# Patient Record
Sex: MALE | Race: WHITE | NOT HISPANIC OR LATINO | URBAN - METROPOLITAN AREA
[De-identification: names, ages, dates, MRNs, and addresses within clinical notes are randomized per-mention and may not be internally consistent; named-entity substitution may affect disease eponyms.]

---

## 2023-02-13 ENCOUNTER — EMERGENCY (EMERGENCY)
Facility: HOSPITAL | Age: 38
LOS: 1 days | Discharge: HOME | End: 2023-02-13
Attending: STUDENT IN AN ORGANIZED HEALTH CARE EDUCATION/TRAINING PROGRAM | Admitting: STUDENT IN AN ORGANIZED HEALTH CARE EDUCATION/TRAINING PROGRAM
Payer: COMMERCIAL

## 2023-02-13 VITALS
DIASTOLIC BLOOD PRESSURE: 85 MMHG | RESPIRATION RATE: 18 BRPM | OXYGEN SATURATION: 98 % | TEMPERATURE: 98 F | SYSTOLIC BLOOD PRESSURE: 135 MMHG | HEART RATE: 65 BPM

## 2023-02-13 VITALS
OXYGEN SATURATION: 96 % | SYSTOLIC BLOOD PRESSURE: 140 MMHG | DIASTOLIC BLOOD PRESSURE: 85 MMHG | WEIGHT: 190.04 LBS | TEMPERATURE: 98 F | HEART RATE: 63 BPM | RESPIRATION RATE: 18 BRPM

## 2023-02-13 LAB
ALBUMIN SERPL ELPH-MCNC: 4.6 G/DL — SIGNIFICANT CHANGE UP (ref 3.3–5)
ALP SERPL-CCNC: 82 U/L — SIGNIFICANT CHANGE UP (ref 40–120)
ALT FLD-CCNC: 23 U/L — SIGNIFICANT CHANGE UP (ref 10–45)
ANION GAP SERPL CALC-SCNC: 12 MMOL/L — SIGNIFICANT CHANGE UP (ref 5–17)
AST SERPL-CCNC: 27 U/L — SIGNIFICANT CHANGE UP (ref 10–40)
BASOPHILS # BLD AUTO: 0.07 K/UL — SIGNIFICANT CHANGE UP (ref 0–0.2)
BASOPHILS NFR BLD AUTO: 1.1 % — SIGNIFICANT CHANGE UP (ref 0–2)
BILIRUB SERPL-MCNC: 1.4 MG/DL — HIGH (ref 0.2–1.2)
BUN SERPL-MCNC: 13 MG/DL — SIGNIFICANT CHANGE UP (ref 7–23)
CALCIUM SERPL-MCNC: 9.5 MG/DL — SIGNIFICANT CHANGE UP (ref 8.4–10.5)
CHLORIDE SERPL-SCNC: 100 MMOL/L — SIGNIFICANT CHANGE UP (ref 96–108)
CO2 SERPL-SCNC: 25 MMOL/L — SIGNIFICANT CHANGE UP (ref 22–31)
CREAT SERPL-MCNC: 1.21 MG/DL — SIGNIFICANT CHANGE UP (ref 0.5–1.3)
EGFR: 78 ML/MIN/1.73M2 — SIGNIFICANT CHANGE UP
EOSINOPHIL # BLD AUTO: 0.37 K/UL — SIGNIFICANT CHANGE UP (ref 0–0.5)
EOSINOPHIL NFR BLD AUTO: 5.6 % — SIGNIFICANT CHANGE UP (ref 0–6)
GLUCOSE SERPL-MCNC: 114 MG/DL — HIGH (ref 70–99)
HCT VFR BLD CALC: 43.3 % — SIGNIFICANT CHANGE UP (ref 39–50)
HGB BLD-MCNC: 14.9 G/DL — SIGNIFICANT CHANGE UP (ref 13–17)
IMM GRANULOCYTES NFR BLD AUTO: 0.3 % — SIGNIFICANT CHANGE UP (ref 0–0.9)
LYMPHOCYTES # BLD AUTO: 1.42 K/UL — SIGNIFICANT CHANGE UP (ref 1–3.3)
LYMPHOCYTES # BLD AUTO: 21.3 % — SIGNIFICANT CHANGE UP (ref 13–44)
MCHC RBC-ENTMCNC: 31.1 PG — SIGNIFICANT CHANGE UP (ref 27–34)
MCHC RBC-ENTMCNC: 34.4 GM/DL — SIGNIFICANT CHANGE UP (ref 32–36)
MCV RBC AUTO: 90.4 FL — SIGNIFICANT CHANGE UP (ref 80–100)
MONOCYTES # BLD AUTO: 0.45 K/UL — SIGNIFICANT CHANGE UP (ref 0–0.9)
MONOCYTES NFR BLD AUTO: 6.8 % — SIGNIFICANT CHANGE UP (ref 2–14)
NEUTROPHILS # BLD AUTO: 4.33 K/UL — SIGNIFICANT CHANGE UP (ref 1.8–7.4)
NEUTROPHILS NFR BLD AUTO: 64.9 % — SIGNIFICANT CHANGE UP (ref 43–77)
NRBC # BLD: 0 /100 WBCS — SIGNIFICANT CHANGE UP (ref 0–0)
PLATELET # BLD AUTO: 206 K/UL — SIGNIFICANT CHANGE UP (ref 150–400)
POTASSIUM SERPL-MCNC: 3.6 MMOL/L — SIGNIFICANT CHANGE UP (ref 3.5–5.3)
POTASSIUM SERPL-SCNC: 3.6 MMOL/L — SIGNIFICANT CHANGE UP (ref 3.5–5.3)
PROT SERPL-MCNC: 6.3 G/DL — SIGNIFICANT CHANGE UP (ref 6–8.3)
RBC # BLD: 4.79 M/UL — SIGNIFICANT CHANGE UP (ref 4.2–5.8)
RBC # FLD: 12 % — SIGNIFICANT CHANGE UP (ref 10.3–14.5)
SARS-COV-2 RNA SPEC QL NAA+PROBE: NEGATIVE — SIGNIFICANT CHANGE UP
SODIUM SERPL-SCNC: 137 MMOL/L — SIGNIFICANT CHANGE UP (ref 135–145)
WBC # BLD: 6.66 K/UL — SIGNIFICANT CHANGE UP (ref 3.8–10.5)
WBC # FLD AUTO: 6.66 K/UL — SIGNIFICANT CHANGE UP (ref 3.8–10.5)

## 2023-02-13 PROCEDURE — 99284 EMERGENCY DEPT VISIT MOD MDM: CPT

## 2023-02-13 PROCEDURE — 80053 COMPREHEN METABOLIC PANEL: CPT

## 2023-02-13 PROCEDURE — 12001 RPR S/N/AX/GEN/TRNK 2.5CM/<: CPT

## 2023-02-13 PROCEDURE — 85025 COMPLETE CBC W/AUTO DIFF WBC: CPT

## 2023-02-13 PROCEDURE — 87635 SARS-COV-2 COVID-19 AMP PRB: CPT

## 2023-02-13 PROCEDURE — 99284 EMERGENCY DEPT VISIT MOD MDM: CPT | Mod: 25

## 2023-02-13 PROCEDURE — 99285 EMERGENCY DEPT VISIT HI MDM: CPT | Mod: 25

## 2023-02-13 PROCEDURE — 36415 COLL VENOUS BLD VENIPUNCTURE: CPT

## 2023-02-13 PROCEDURE — 96374 THER/PROPH/DIAG INJ IV PUSH: CPT | Mod: XU

## 2023-02-13 PROCEDURE — 11750 EXCISION NAIL&NAIL MATRIX: CPT | Mod: T5,XU

## 2023-02-13 RX ORDER — CEFAZOLIN SODIUM 1 G
1000 VIAL (EA) INJECTION ONCE
Refills: 0 | Status: COMPLETED | OUTPATIENT
Start: 2023-02-13 | End: 2023-02-13

## 2023-02-13 RX ORDER — CEPHALEXIN 500 MG
1 CAPSULE ORAL
Qty: 21 | Refills: 0
Start: 2023-02-13 | End: 2023-02-19

## 2023-02-13 RX ADMIN — Medication 100 MILLIGRAM(S): at 23:03

## 2023-02-13 NOTE — ED PROVIDER NOTE - NSFOLLOWUPINSTRUCTIONS_ED_ALL_ED_FT
keep dressing dry intact and clean . If conditions worsen return to ED: increased redness, swelling, pain, fever, chills, pus drainage.     Abx sent to your pharmacy, take as directed.     Patient should follow up with Dr. Rinku Zimmerman within 1 week of discharge.    Office information:          [place] Address- [address], [place] [address] Phone: [phone]         [place] Address- [address], [place] [address] Phone: [phone]         [place] Address- [address] ([address]trance on [address]) [place] [address] Phone [phone]        Toe Fracture    A foot showing fractures in the bones of the first two toes.   A toe fracture is a break in one of the toe bones (phalanges). A toe fracture may be:  •A crack in the surface of the bone (stress fracture). This often occurs in athletes.      •A break all the way through the bone (complete fracture).        What are the causes?    This condition may be caused by:  •Direct impact, such as from dropping a heavy object on your toe.      •Stubbing your toe.      •Twisting or stretching your toe out of place.      •Overuse or repetitive exercise.        What increases the risk?    You are more likely to develop this condition if you:  •Play contact sports.      •Have a condition that causes the bones to become thin and brittle (osteoporosis).      •Have a low calcium level.        What are the signs or symptoms?  Bones and joints of the foot and toe showing a fracture and blood beneath the toenail.   The main symptoms of this condition are swelling and pain in the toe. Other symptoms include:  •Bruising.      •Stiffness.      •Numbness.      •A change in the way the toe looks.      •Broken bones that poke through the skin.      •Blood beneath the toenail.        How is this diagnosed?    This condition is diagnosed with a physical exam. You may also have X-rays.      How is this treated?    Treatment for this condition depends on the type of fracture and its severity. Treatment may include:  •Taping the broken toe to a toe that is next to it (shay taping). This is the most common treatment for fractures in which the bone has not moved out of place (non-displaced fracture).      •Wearing a shoe that has a wide, rigid sole to protect the toe and to limit its movement.      •Wearing a walking cast.      •Having a procedure to move the toe back into place.    •Surgery. This may be needed if the:  •Pieces of broken bone are out of place (displaced).      •Bone breaks through the skin.        •Physical therapy. This is done to help regain movement and strength in the toe.      You may need follow-up X-rays to make sure that the bone is healing well and staying in position.      Follow these instructions at home:    If you have a shoe:     •Wear the shoe as told by your health care provider. Remove it only as told by your health care provider.       • Loosen the shoe if your toes tingle, become numb, or turn cold and blue.      •Keep the shoe clean and dry.      If you have a cast:     • Do not put pressure on any part of the cast until it is fully hardened. This may take several hours.      • Do not stick anything inside the cast to scratch your skin. Doing that increases your risk of infection.      •Check the skin around the cast every day. Tell your health care provider about any concerns.       • You may put lotion on dry skin around the edges of the cast. Do not put lotion on the skin underneath the cast.       •Keep the cast clean and dry.      Bathing     • Do not take baths, swim, or use a hot tub until your health care provider approves. Ask your health care provider if you can take showers.    •If the shoe or cast is not waterproof:  •Do not let it get wet.       •Cover it with a watertight covering when you take a bath or a shower.        Activity     • Do not use the injured foot to support your body weight until your health care provider says that you can. Use crutches as directed.    •Ask your health care provider:  •What activities are safe for you during recovery.      •What activities you need to avoid.        •Do physical therapy exercises as directed.      Driving     • Do not drive or use heavy machinery while taking pain medicine.      • Do not drive while wearing a cast on a foot that you use for driving.        Managing pain, stiffness, and swelling   A bag of ice on a towel on the skin. •If directed, put ice on painful areas:  •Put ice in a plastic bag.    •Place a towel between your skin and the bag.  •If you have a shoe, remove it as told by your health care provider.      •If you have a cast, place a towel between your cast and the bag.        •Leave the ice on for 20 minutes, 2–3 times per day.        •Raise (elevate) the injured area above the level of your heart while you are sitting or lying down.      General instructions   •If your toe was treated with shay taping, follow your health care provider's instructions for changing the gauze and tape. Change it more often if:  •The gauze and tape get wet. If this happens, dry the space between the toes.      •The gauze and tape are too tight and cause your toe to become pale or numb.        •If you were not given a protective shoe, wear sturdy, supportive shoes. Your shoes should not pinch your toes and should not fit tightly against your toes.      • Do not use any products that contain nicotine or tobacco, such as cigarettes and e-cigarettes. These can delay bone healing. If you need help quitting, ask your health care provider.      •Take over-the-counter and prescription medicines only as told by your health care provider.      •Keep all follow-up visits as told by your health care provider. This is important.        Contact a health care provider if you have:    •Pain that gets worse or does not get better with medicine.      •A fever.      •A bad smell coming from your cast.        Get help right away if you have:  •Any of the following in your toes or your foot:  •Numbness that gets worse.      •Tingling.      •Coldness.      •Blue skin.        •Redness or swelling that gets worse.      •Pain that suddenly becomes severe.        Summary    •A toe fracture is a break in one of the toe bones (phalanges).      •Treatment depends on how severe your fracture is and how the pieces of the broken bone line up with each other. Treatment may include shay taping, wearing a shoe or a cast, or using crutches.      •Ice and elevate your foot to help lessen the pain and swelling.      This information is not intended to replace advice given to you by your health care provider. Make sure you discuss any questions you have with your health care provider.

## 2023-02-13 NOTE — ED PROVIDER NOTE - CLINICAL SUMMARY MEDICAL DECISION MAKING FREE TEXT BOX
37 y.o male complaining of R toe pain. outpt XR shows non-displaced fx of r great toe. Clinically >50% subungual hematoma of the R great toe.     Plan  -Podiatry evaluation and disposition  - d/c with podiatry recc, and iv abx

## 2023-02-13 NOTE — ED PROVIDER NOTE - PHYSICAL EXAMINATION
R foot:   R foot pulses +2  neruo intact   normal ROM of R 1st MPJ   >50% sungunal hematoma of the great toe, with dreied hematoma at the base of the nail  Tender to palpation R great toe

## 2023-02-13 NOTE — ED PROVIDER NOTE - PROGRESS NOTE DETAILS
Podiatry bedside for evaluation and nail avulsion. Pt with no complaints. To be discharged home with loca wound care reccs per podiatry, PO abx, and podiatry outpt follow up.

## 2023-02-13 NOTE — ED PROVIDER NOTE - PATIENT PORTAL LINK FT
You can access the FollowMyHealth Patient Portal offered by [facility] by registering at the following website: [email or web address]. By joining [employer]’s FollowMyHealth portal, you will also be able to view your health information using other applications (apps) compatible with our system.

## 2023-02-13 NOTE — ED PROVIDER NOTE - NS ED ATTENDING STATEMENT MOD
I have seen and examined this patient and fully participated in the care of this patient as the teaching attending.  The service was shared with the ADOLPH.  I reviewed and verified the documentation and independently performed the documented:

## 2023-02-13 NOTE — ED ADULT NURSE NOTE - OBJECTIVE STATEMENT
Pt is 37 y.o male client complaining of R toe pain. Pt states a kettle fell on his toe yesterday and had pain and bleeding. Went to [facility] today and had imaging sows fracture. Pt has bandage wrapped around toe. Denies fever and chills, numbness and tingling. Assessment ongoing. Will cont to monitor.

## 2023-02-13 NOTE — ED PROVIDER NOTE - ATTENDING CONTRIBUTION TO CARE
37 y.o male complaining of R toe pain after an object fell on his R great toe yesterday, XR from  shows non-displaced fx of R great toe. On exam, VS wnl, pt awake alert and nad skin warm well perfused no diaphoresis a/w patent pt speaking and breathing comfortably. pt evaluated by podiatry who recommend 1 dose of ancef, the R great toe was debrided, pt was discharged with keflex and podiatry f/u per podiatry recs

## 2023-02-13 NOTE — ED PROVIDER NOTE - OBJECTIVE STATEMENT
37 y.o male complaining of R toe pain. Pt states a kettle fell on his toe yesterday and had pain and bleeding. Went to [facility] today and had imaging showed non-displaced fracture of the great toe. Reports no pain on ambulation and adequate ROM. Tender to palpation.  Pt has bandage wrapped around toe. Denies fever and chills, numbness and tingling.

## 2023-02-14 DIAGNOSIS — S91.219A: ICD-10-CM

## 2023-02-14 NOTE — CONSULT NOTE ADULT - SUBJECTIVE AND OBJECTIVE BOX
Attending: Dr. Zimmerman    Patient is a 37y old  Male who presents with a chief complaint of right great toe pain     HPI:  38 y/o male no pmhx complaining of R toe pain. Pt states a kettle fell on his toe yesterday and had pain and bleeding. Went to [facility] today and had imaging showed non-displaced fracture of the great toe. Reports no pain on ambulation and adequate ROM. Tender to palpation. Pt has bandage wrapped around toe. Denies fever and chills, numbness and tingling.    Review of systems negative except per HPI and as stated below  General:	 no weakness; no fevers, no chills  Skin/Breast: no rash  Respiratory and Thorax: no SOB, no cough  Cardiovascular:	No chest pain  Gastrointestinal:	 no nausea, vomiting , diarrhea  Genitourinary:	no dysuria, no difficulty urinating, no hematuria  Musculoskeletal:	no weakness, no joint swelling/pain  Neurological:	no focal weakness/numbness  Endocrine:	no polyuria, no polydipsia    PAST MEDICAL & SURGICAL HISTORY:  No pertinent past medical history      No significant past surgical history        Home Medications: none    Allergies    No Known Allergies    Intolerances      FAMILY HISTORY: No pertinent medical history in first degree relatives    Social History: admits to ETOH       LABS                        14.9   6.66  )-----------( 206      ( 13 Feb 2023 23:26 )             43.3     02-13    137  |  100  |  13  ----------------------------<  114<H>  3.6   |  25  |  1.21    Ca    9.5      13 Feb 2023 23:26    TPro  6.3  /  Alb  4.6  /  TBili  1.4<H>  /  DBili  x   /  AST  27  /  ALT  23  /  AlkPhos  82  02-13        Vital Signs Last 24 Hrs  T(C): 36.9 (13 Feb 2023 23:24), Max: 36.9 (13 Feb 2023 21:23)  T(F): 98.5 (13 Feb 2023 23:24), Max: 98.5 (13 Feb 2023 23:24)  HR: 65 (13 Feb 2023 23:24) (63 - 65)  BP: 135/85 (13 Feb 2023 23:24) (135/85 - 140/85)  BP(mean): --  RR: 18 (13 Feb 2023 23:24) (18 - 18)  SpO2: 98% (13 Feb 2023 23:24) (96% - 98%)    Parameters below as of 13 Feb 2023 23:24  Patient On (Oxygen Delivery Method): room air      PHYSICAL EXAM  General: NAD, AA0x3  Lower Extremity Focused: right foot  Vasc: DP/PT 2/4 , slight edema to right great toe, ecchymosis to nail matrix    Derm: >50% subungual hematoma of the great toe, with dried hematoma at the base of the nail, laceration to nail matrix, nail slightly lifted   Neuro: sensation intact to all digits   MSK: right great toe ttp, 5/5 muscle strength in all compartments      RADIOLOGY: x ray right foot: WET READ: non-displaced comminuted fracture of the distal phalanx of the right great toe

## 2023-02-14 NOTE — PROCEDURE NOTE - ADDITIONAL PROCEDURE DETAILS
OPERATIVE PROCEDURE:   local infiltration nerve block was administered to the right foot great toe utilizing 7cc 1% lidocaine plain in H block fashion. Chlorohexadine prep was used to sterilize the right foot prior to local anesthetic block.    The right foot was prepped and draped in aseptic manner. Anesthesia check was performed and verified. Right great toe nail was avulsed in toto. Small simple laceration was noted to nail bed with overlying hematoma, nail bed was then copiously irrigated using normal saline. Hematoma was evacuated in toto.     Suture repair of the simple laceration was instituted using 2 5-0 Monocryl suture placed in simple interrupted fashion. Skin closure was applied by these means.     Dressing were applied consisting bacitracin + Xeroform +  Coban + DSD applied to right foot with surgical shoe.

## 2023-02-14 NOTE — PROCEDURE NOTE - NSLAC1DETAILSPROC_SKIN_A_CORE
wound explored to base in bloodless field/all foreign material removed/nail excised/extensive debridement

## 2023-02-14 NOTE — CONSULT NOTE ADULT - ASSESSMENT
A: 38 y/o male no pmhx complaining of R toe pain. Pt states a kettle fell on his toe yesterday and had pain and bleeding. Went to [facility] today and had imaging showed non-displaced fracture of the great toe. Podiatry consulted for right great toe fracture. Pt found to have right great toe open fracture with subungual hematoma.     Plan:   X-ray reviewed  Rec 1g  IV ancef   Total nail avulsion performed of right great toe with nail laceration repair-see procedure note for details   Wound dressed with bacitracin, xeroform, guaze, coban, kerlix, ace  Pt instructed to rest right foot as much as possible and only ambulate in a surgical shoe   Rec 7 days of Keflex TID  Pt can take OTC pain medication for pain control   Pt instructed to keep bandage clean dry and intact until follow up visit with Dr. Zimmerman     Plan d/w attending     Patient should follow up with Dr. Rinku Zimmerman within 1 week of discharge.    Office information:          [place] Address- [address], [place] [address] Phone: [phone]         [place] Address- [address], [place] [address] Phone: [phone]

## 2023-02-16 DIAGNOSIS — M79.674 PAIN IN RIGHT TOE(S): ICD-10-CM

## 2023-02-16 DIAGNOSIS — Z20.822 CONTACT WITH AND (SUSPECTED) EXPOSURE TO COVID-19: ICD-10-CM

## 2023-02-16 DIAGNOSIS — S92.424A NONDISPLACED FRACTURE OF DISTAL PHALANX OF RIGHT GREAT TOE, INITIAL ENCOUNTER FOR CLOSED FRACTURE: ICD-10-CM
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
## 2023-02-16 DIAGNOSIS — S91.211A LACERATION WITHOUT FOREIGN BODY OF RIGHT GREAT TOE WITH DAMAGE TO NAIL, INITIAL ENCOUNTER: ICD-10-CM
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
## 2025-05-22 NOTE — ED PROVIDER NOTE - PSYCHIATRIC, MLM
Chief Complaint   Patient presents with   • Office Visit   • Derm Problem       Pete Brice presents with:    History of Present Illness:    Complaint: Patient presents with a rash on the arms, legs and stomach that started 6 months ago.   Symptoms: itchy  Previous treatments: triamcinolone ointment     Past Dermatologic History:    Eczema     Family History:   He does not  have a family history of skin cancer.     Medications, the past medical and surgical history were reviewed in the EMR (electronic medical record) and updated as necessary.     Exam:    General: well developed, well nourished, in no acute distress.   SKIN:  Inspection and palpation of the area(s) of concern was performed, revealing:  Scaly erythematous patch(es) / plaque(s) on the arms, legs and abdomen       Assessment and Plan:    Diagnoses and all orders for this visit:  Other eczema  -     triamcinolone (ARISTOCORT) 0.1 % cream; Apply topically 2 times daily. To itchy/scaly areas on the arms, legs and trunk  -     predniSONE (DELTASONE) 10 MG tablet; Take 4 tablets by mouth daily for 7 days, THEN 3 tablets daily for 7 days, THEN 2 tablets daily for 7 days, THEN 1 tablet daily for 7 days.     Patient with longstanding mild atopic dermatitis, however recent flare more consistent with allergic contact dermatitis.  Patient elects for trial of prednisone taper as well as more aggressive use of triamcinolone.    We discussed patch testing, as patient notes he may be getting exposed to something at work which seems to flare these.  He declined specialist referral at this time.    If patch testing negative in future, would be good candidate for systemic agent if this simply represents worsening of his longer standing atopic Derm      Return in about 3 months (around 8/22/2025) for eczema. / PRN    On 5/22/2025, Natalie LOUIS CMA scribed the services personally performed by Pawel Dennis MD    ---    The documentation recorded by the  scribe accurately and completely reflects the service(s) I personally performed and the decisions made by me.     Pawel Dennis MD  Dermatology     Alert and oriented to person, place, time/situation. normal mood and affect. no apparent risk to self or others.